# Patient Record
Sex: FEMALE | Race: WHITE | NOT HISPANIC OR LATINO | Employment: OTHER | URBAN - METROPOLITAN AREA
[De-identification: names, ages, dates, MRNs, and addresses within clinical notes are randomized per-mention and may not be internally consistent; named-entity substitution may affect disease eponyms.]

---

## 2019-06-19 NOTE — PATIENT DISCUSSION
POSTERIOR VITREOUS DETACHMENT OS: SCLERAL DEPRESSION PERFORMED TODAY - NO HOLES, TEARS, OR RETINAL DETACHMENTS TODAY. ADVISED PT TO CALL IF ANY FLASHES OF LIGHT, INCREASE IN FLOATERS, OR DECREASE VISION IN EITHER EYE.

## 2020-07-08 ENCOUNTER — NEW PATIENT COMPREHENSIVE (OUTPATIENT)
Dept: URBAN - METROPOLITAN AREA CLINIC 32 | Facility: CLINIC | Age: 65
End: 2020-07-08

## 2020-07-08 DIAGNOSIS — H25.13: ICD-10-CM

## 2020-07-08 DIAGNOSIS — H40.013: ICD-10-CM

## 2020-07-08 PROCEDURE — 92004 COMPRE OPH EXAM NEW PT 1/>: CPT

## 2020-07-08 PROCEDURE — 92133 CPTRZD OPH DX IMG PST SGM ON: CPT

## 2020-07-08 PROCEDURE — 92015 DETERMINE REFRACTIVE STATE: CPT

## 2020-07-08 ASSESSMENT — VISUAL ACUITY
OD_SC: 20/40-1
OD_GLARE: 20/100
OD_CC: 20/30
OD_SC: J10
OD_CC: J1+
OS_SC: 20/40-1
OS_SC: J10
OS_GLARE: 20/100
OS_CC: J1
OS_CC: 20/30+1

## 2020-07-08 ASSESSMENT — KERATOMETRY
OS_K1POWER_DIOPTERS: 45.75
OD_K2POWER_DIOPTERS: 44.75
OD_AXISANGLE_DEGREES: 33
OS_K2POWER_DIOPTERS: 45.5
OS_AXISANGLE2_DEGREES: 27
OD_K1POWER_DIOPTERS: 45
OS_AXISANGLE_DEGREES: 117
OD_AXISANGLE2_DEGREES: 123

## 2020-07-08 ASSESSMENT — TONOMETRY
OD_IOP_MMHG: 19
OS_IOP_MMHG: 20

## 2022-06-04 ENCOUNTER — TELEPHONE ENCOUNTER (OUTPATIENT)
Dept: URBAN - METROPOLITAN AREA CLINIC 68 | Facility: CLINIC | Age: 67
End: 2022-06-04

## 2022-06-05 ENCOUNTER — TELEPHONE ENCOUNTER (OUTPATIENT)
Dept: URBAN - METROPOLITAN AREA CLINIC 68 | Facility: CLINIC | Age: 67
End: 2022-06-05

## 2022-06-25 ENCOUNTER — TELEPHONE ENCOUNTER (OUTPATIENT)
Age: 67
End: 2022-06-25

## 2022-06-26 ENCOUNTER — TELEPHONE ENCOUNTER (OUTPATIENT)
Age: 67
End: 2022-06-26

## 2022-07-14 ENCOUNTER — CONSULTATION/EVALUATION (OUTPATIENT)
Dept: URBAN - METROPOLITAN AREA CLINIC 32 | Facility: CLINIC | Age: 67
End: 2022-07-14

## 2022-07-14 DIAGNOSIS — H57.813: ICD-10-CM

## 2022-07-14 DIAGNOSIS — H40.013: ICD-10-CM

## 2022-07-14 DIAGNOSIS — H16.223: ICD-10-CM

## 2022-07-14 DIAGNOSIS — H02.834: ICD-10-CM

## 2022-07-14 DIAGNOSIS — H02.831: ICD-10-CM

## 2022-07-14 DIAGNOSIS — H25.13: ICD-10-CM

## 2022-07-14 PROCEDURE — 92081 LIMITED VISUAL FIELD XM: CPT

## 2022-07-14 PROCEDURE — 92285 EXTERNAL OCULAR PHOTOGRAPHY: CPT

## 2022-07-14 PROCEDURE — 99213 OFFICE O/P EST LOW 20 MIN: CPT

## 2022-07-14 ASSESSMENT — KERATOMETRY
OS_AXISANGLE_DEGREES: 117
OS_K2POWER_DIOPTERS: 45.5
OS_K1POWER_DIOPTERS: 45.75
OD_K1POWER_DIOPTERS: 45
OD_K2POWER_DIOPTERS: 44.75
OD_AXISANGLE2_DEGREES: 123
OD_AXISANGLE_DEGREES: 33
OS_AXISANGLE2_DEGREES: 27

## 2022-07-14 ASSESSMENT — VISUAL ACUITY
OD_SC: 20/25
OS_SC: 20/25

## 2022-09-21 ENCOUNTER — SURGERY/PROCEDURE (OUTPATIENT)
Dept: URBAN - METROPOLITAN AREA CLINIC 32 | Facility: CLINIC | Age: 67
End: 2022-09-21

## 2022-09-21 DIAGNOSIS — H02.831: ICD-10-CM

## 2022-09-21 DIAGNOSIS — H02.834: ICD-10-CM

## 2022-09-21 PROCEDURE — 1582350 UPPER BLEPH PER EYE FUNCTIONAL-BILATERAL

## 2022-09-21 PROCEDURE — 17999LSR LASER SKIN RESURFACING 1 RG

## 2022-09-21 PROCEDURE — 15281B BLEPH W/HERN FAT PAD LOWER LID- BERLIE

## 2022-09-22 ENCOUNTER — POST-OP (OUTPATIENT)
Dept: URBAN - METROPOLITAN AREA CLINIC 32 | Facility: CLINIC | Age: 67
End: 2022-09-22

## 2022-09-22 DIAGNOSIS — H57.813: ICD-10-CM

## 2022-09-22 DIAGNOSIS — Z98.890: ICD-10-CM

## 2022-09-22 DIAGNOSIS — T81.30XA: ICD-10-CM

## 2022-09-22 PROCEDURE — 99024 POSTOP FOLLOW-UP VISIT: CPT

## 2022-09-22 RX ORDER — CEPHALEXIN 500 MG/1
1 CAPSULE ORAL
Start: 2022-09-22

## 2022-09-22 ASSESSMENT — KERATOMETRY
OS_AXISANGLE2_DEGREES: 27
OS_AXISANGLE_DEGREES: 117
OS_K2POWER_DIOPTERS: 45.5
OD_K2POWER_DIOPTERS: 44.75
OS_K1POWER_DIOPTERS: 45.75
OD_AXISANGLE_DEGREES: 33
OD_K1POWER_DIOPTERS: 45
OD_AXISANGLE2_DEGREES: 123

## 2022-09-22 ASSESSMENT — VISUAL ACUITY
OD_SC: 20/30-2
OS_SC: 20/25

## 2022-09-26 ENCOUNTER — POST-OP (OUTPATIENT)
Dept: URBAN - METROPOLITAN AREA CLINIC 32 | Facility: CLINIC | Age: 67
End: 2022-09-26

## 2022-09-26 DIAGNOSIS — T81.30XD: ICD-10-CM

## 2022-09-26 DIAGNOSIS — H16.223: ICD-10-CM

## 2022-09-26 DIAGNOSIS — Z98.890: ICD-10-CM

## 2022-09-26 PROCEDURE — 99024 POSTOP FOLLOW-UP VISIT: CPT

## 2022-09-26 ASSESSMENT — KERATOMETRY
OD_AXISANGLE2_DEGREES: 123
OD_AXISANGLE_DEGREES: 33
OS_K1POWER_DIOPTERS: 45.75
OS_AXISANGLE2_DEGREES: 27
OS_AXISANGLE_DEGREES: 117
OS_K2POWER_DIOPTERS: 45.5
OD_K1POWER_DIOPTERS: 45
OD_K2POWER_DIOPTERS: 44.75

## 2022-09-26 ASSESSMENT — VISUAL ACUITY
OS_SC: 20/30
OD_SC: 20/30

## 2022-09-27 ASSESSMENT — KERATOMETRY
OD_K1POWER_DIOPTERS: 45
OS_AXISANGLE_DEGREES: 117
OS_AXISANGLE2_DEGREES: 27
OD_AXISANGLE2_DEGREES: 123
OS_K2POWER_DIOPTERS: 45.5
OD_AXISANGLE_DEGREES: 33
OS_K1POWER_DIOPTERS: 45.75
OD_K2POWER_DIOPTERS: 44.75

## 2022-10-03 ENCOUNTER — POST-OP (OUTPATIENT)
Dept: URBAN - METROPOLITAN AREA CLINIC 32 | Facility: CLINIC | Age: 67
End: 2022-10-03

## 2022-10-03 DIAGNOSIS — Z98.890: ICD-10-CM

## 2022-10-03 PROCEDURE — 99024 POSTOP FOLLOW-UP VISIT: CPT

## 2022-10-03 RX ORDER — PETROLATUM 41 G/100G: OINTMENT TOPICAL

## 2022-10-03 ASSESSMENT — KERATOMETRY
OD_K2POWER_DIOPTERS: 44.75
OD_K1POWER_DIOPTERS: 45
OS_K2POWER_DIOPTERS: 45.5
OD_AXISANGLE_DEGREES: 33
OS_AXISANGLE_DEGREES: 117
OS_K1POWER_DIOPTERS: 45.75
OD_AXISANGLE2_DEGREES: 123
OS_AXISANGLE2_DEGREES: 27

## 2022-10-03 ASSESSMENT — VISUAL ACUITY
OS_CC: 20/25
OD_CC: 20/25

## 2022-10-11 ENCOUNTER — FOLLOW UP (OUTPATIENT)
Dept: URBAN - METROPOLITAN AREA CLINIC 32 | Facility: CLINIC | Age: 67
End: 2022-10-11

## 2022-10-11 DIAGNOSIS — Z98.890: ICD-10-CM

## 2022-10-11 DIAGNOSIS — H11.421: ICD-10-CM

## 2022-10-11 PROCEDURE — 99024 POSTOP FOLLOW-UP VISIT: CPT

## 2022-10-11 ASSESSMENT — VISUAL ACUITY
OS_SC: 20/25-1
OD_SC: 20/25

## 2022-10-11 ASSESSMENT — KERATOMETRY
OD_K2POWER_DIOPTERS: 44.75
OS_AXISANGLE_DEGREES: 117
OD_K1POWER_DIOPTERS: 45
OS_AXISANGLE2_DEGREES: 27
OD_AXISANGLE2_DEGREES: 123
OS_K2POWER_DIOPTERS: 45.5
OS_K1POWER_DIOPTERS: 45.75
OD_AXISANGLE_DEGREES: 33

## 2022-11-17 ENCOUNTER — FOLLOW UP (OUTPATIENT)
Dept: URBAN - METROPOLITAN AREA CLINIC 32 | Facility: CLINIC | Age: 67
End: 2022-11-17

## 2022-11-17 DIAGNOSIS — H16.223: ICD-10-CM

## 2022-11-17 DIAGNOSIS — Z98.890: ICD-10-CM

## 2022-11-17 PROCEDURE — 99024 POSTOP FOLLOW-UP VISIT: CPT

## 2022-11-17 ASSESSMENT — VISUAL ACUITY
OS_CC: J1+
OD_CC: 20/20-2
OS_CC: 20/20-1
OD_CC: J1+

## 2022-11-17 ASSESSMENT — KERATOMETRY
OD_K2POWER_DIOPTERS: 44.75
OD_K1POWER_DIOPTERS: 45
OS_AXISANGLE_DEGREES: 117
OD_AXISANGLE2_DEGREES: 123
OS_K2POWER_DIOPTERS: 45.5
OD_AXISANGLE_DEGREES: 33
OS_K1POWER_DIOPTERS: 45.75
OS_AXISANGLE2_DEGREES: 27

## 2022-11-17 ASSESSMENT — TONOMETRY
OS_IOP_MMHG: 18
OD_IOP_MMHG: 18

## 2023-07-13 ENCOUNTER — COMPREHENSIVE EXAM (OUTPATIENT)
Dept: URBAN - METROPOLITAN AREA CLINIC 32 | Facility: CLINIC | Age: 68
End: 2023-07-13

## 2023-07-13 DIAGNOSIS — H16.223: ICD-10-CM

## 2023-07-13 DIAGNOSIS — H25.13: ICD-10-CM

## 2023-07-13 DIAGNOSIS — H43.812: ICD-10-CM

## 2023-07-13 DIAGNOSIS — H43.813: ICD-10-CM

## 2023-07-13 DIAGNOSIS — H40.013: ICD-10-CM

## 2023-07-13 PROCEDURE — 99214 OFFICE O/P EST MOD 30 MIN: CPT

## 2023-07-13 PROCEDURE — 92250 FUNDUS PHOTOGRAPHY W/I&R: CPT

## 2023-07-13 PROCEDURE — 92015 DETERMINE REFRACTIVE STATE: CPT

## 2023-07-13 ASSESSMENT — KERATOMETRY
OD_K2POWER_DIOPTERS: 45.00
OD_AXISANGLE2_DEGREES: 123
OS_AXISANGLE2_DEGREES: 27
OS_AXISANGLE_DEGREES: 151
OD_K1POWER_DIOPTERS: 45.25
OS_K2POWER_DIOPTERS: 45.25
OS_AXISANGLE_DEGREES: 117
OS_K1POWER_DIOPTERS: 45.75
OD_AXISANGLE_DEGREES: 51
OS_AXISANGLE2_DEGREES: 61
OD_AXISANGLE_DEGREES: 33
OD_K1POWER_DIOPTERS: 45
OS_K2POWER_DIOPTERS: 45.5
OD_K2POWER_DIOPTERS: 44.75
OS_K1POWER_DIOPTERS: 45.50
OD_AXISANGLE2_DEGREES: 141

## 2023-07-13 ASSESSMENT — VISUAL ACUITY
OU_CC: J1+
OS_SC: 20/30
OD_CC: 20/25+2
OD_SC: 20/25
OS_CC: 20/25
OD_CC: J1
OS_CC: J2
OU_SC: 20/20-2
OU_CC: 20/20

## 2023-07-13 ASSESSMENT — TONOMETRY
OS_IOP_MMHG: 21
OD_IOP_MMHG: 19